# Patient Record
Sex: FEMALE | Race: WHITE | Employment: UNEMPLOYED | URBAN - METROPOLITAN AREA
[De-identification: names, ages, dates, MRNs, and addresses within clinical notes are randomized per-mention and may not be internally consistent; named-entity substitution may affect disease eponyms.]

---

## 2019-05-17 LAB
CREAT SERPL-MCNC: 0.92 MG/DL (ref 0.4–1)
GFR SERPL CREATININE-BSD FRML MDRD: >60 ML/MIN/1.73M2
GLUCOSE SERPL-MCNC: 123 MG/DL (ref 70–99)
POTASSIUM SERPL-SCNC: 4.1 MEQ/L (ref 3.4–5.1)

## 2019-05-18 ENCOUNTER — TRANSFERRED RECORDS (OUTPATIENT)
Dept: HEALTH INFORMATION MANAGEMENT | Facility: CLINIC | Age: 19
End: 2019-05-18

## 2019-05-18 ENCOUNTER — HOSPITAL ENCOUNTER (INPATIENT)
Facility: HOSPITAL | Age: 19
LOS: 2 days | Discharge: NURSING, REHAB OR LONG-TERM CARE | End: 2019-05-20
Attending: PSYCHIATRY & NEUROLOGY | Admitting: PSYCHIATRY & NEUROLOGY
Payer: COMMERCIAL

## 2019-05-18 PROBLEM — F32.A DEPRESSION WITH SUICIDAL IDEATION: Status: ACTIVE | Noted: 2019-05-18

## 2019-05-18 PROBLEM — R45.851 DEPRESSION WITH SUICIDAL IDEATION: Status: ACTIVE | Noted: 2019-05-18

## 2019-05-18 PROCEDURE — 25000132 ZZH RX MED GY IP 250 OP 250 PS 637: Performed by: NURSE PRACTITIONER

## 2019-05-18 PROCEDURE — 99223 1ST HOSP IP/OBS HIGH 75: CPT | Performed by: NURSE PRACTITIONER

## 2019-05-18 PROCEDURE — 12400000 ZZH R&B MH

## 2019-05-18 RX ORDER — BUPROPION HYDROCHLORIDE 300 MG/1
300 TABLET ORAL DAILY
COMMUNITY
Start: 2019-02-13

## 2019-05-18 RX ORDER — PRAMIPEXOLE DIHYDROCHLORIDE 0.25 MG/1
0.25 TABLET ORAL AT BEDTIME
COMMUNITY
Start: 2019-04-19

## 2019-05-18 RX ORDER — METFORMIN HCL 500 MG
500 TABLET, EXTENDED RELEASE 24 HR ORAL DAILY
COMMUNITY
Start: 2019-01-11

## 2019-05-18 RX ORDER — HYDROXYZINE HYDROCHLORIDE 25 MG/1
25-50 TABLET, FILM COATED ORAL EVERY 4 HOURS PRN
Status: DISCONTINUED | OUTPATIENT
Start: 2019-05-18 | End: 2019-05-20 | Stop reason: HOSPADM

## 2019-05-18 RX ORDER — IBUPROFEN 200 MG
400 TABLET ORAL EVERY 6 HOURS PRN
COMMUNITY

## 2019-05-18 RX ORDER — ALBUTEROL SULFATE 90 UG/1
2 AEROSOL, METERED RESPIRATORY (INHALATION) EVERY 4 HOURS PRN
Status: DISCONTINUED | OUTPATIENT
Start: 2019-05-18 | End: 2019-05-20 | Stop reason: HOSPADM

## 2019-05-18 RX ORDER — CHOLECALCIFEROL (VITAMIN D3) 125 MCG
3000 CAPSULE ORAL 3 TIMES DAILY PRN
Status: DISCONTINUED | OUTPATIENT
Start: 2019-05-18 | End: 2019-05-20 | Stop reason: HOSPADM

## 2019-05-18 RX ORDER — GUAIFENESIN 600 MG/1
600 TABLET, EXTENDED RELEASE ORAL EVERY 12 HOURS PRN
Status: DISCONTINUED | OUTPATIENT
Start: 2019-05-18 | End: 2019-05-20 | Stop reason: HOSPADM

## 2019-05-18 RX ORDER — MAGNESIUM HYDROXIDE/ALUMINUM HYDROXICE/SIMETHICONE 120; 1200; 1200 MG/30ML; MG/30ML; MG/30ML
15 SUSPENSION ORAL
COMMUNITY

## 2019-05-18 RX ORDER — OLANZAPINE 10 MG/1
10 TABLET ORAL 2 TIMES DAILY PRN
Status: DISCONTINUED | OUTPATIENT
Start: 2019-05-18 | End: 2019-05-18

## 2019-05-18 RX ORDER — PRAMIPEXOLE DIHYDROCHLORIDE 0.25 MG/1
0.25 TABLET ORAL AT BEDTIME
Status: DISCONTINUED | OUTPATIENT
Start: 2019-05-18 | End: 2019-05-20 | Stop reason: HOSPADM

## 2019-05-18 RX ORDER — METFORMIN HCL 500 MG
500 TABLET, EXTENDED RELEASE 24 HR ORAL DAILY
Status: DISCONTINUED | OUTPATIENT
Start: 2019-05-19 | End: 2019-05-20 | Stop reason: HOSPADM

## 2019-05-18 RX ORDER — ALUMINA, MAGNESIA, AND SIMETHICONE 2400; 2400; 240 MG/30ML; MG/30ML; MG/30ML
15 SUSPENSION ORAL
Status: DISCONTINUED | OUTPATIENT
Start: 2019-05-18 | End: 2019-05-20 | Stop reason: HOSPADM

## 2019-05-18 RX ORDER — TRAZODONE HYDROCHLORIDE 50 MG/1
50 TABLET, FILM COATED ORAL
COMMUNITY
Start: 2019-02-13

## 2019-05-18 RX ORDER — BUPROPION HYDROCHLORIDE 150 MG/1
300 TABLET ORAL DAILY
Status: DISCONTINUED | OUTPATIENT
Start: 2019-05-19 | End: 2019-05-20 | Stop reason: HOSPADM

## 2019-05-18 RX ORDER — IBUPROFEN 400 MG/1
400 TABLET, FILM COATED ORAL EVERY 6 HOURS PRN
Status: DISCONTINUED | OUTPATIENT
Start: 2019-05-18 | End: 2019-05-18

## 2019-05-18 RX ORDER — ACETAMINOPHEN 325 MG/1
650 TABLET ORAL EVERY 4 HOURS PRN
COMMUNITY
Start: 2019-02-06

## 2019-05-18 RX ORDER — OLANZAPINE 10 MG/2ML
10 INJECTION, POWDER, FOR SOLUTION INTRAMUSCULAR 2 TIMES DAILY PRN
Status: DISCONTINUED | OUTPATIENT
Start: 2019-05-18 | End: 2019-05-18

## 2019-05-18 RX ORDER — CHOLECALCIFEROL (VITAMIN D3) 125 MCG
3000 CAPSULE ORAL 3 TIMES DAILY PRN
COMMUNITY
Start: 2019-03-25

## 2019-05-18 RX ORDER — ARIPIPRAZOLE 15 MG/1
15 TABLET ORAL DAILY
Status: DISCONTINUED | OUTPATIENT
Start: 2019-05-19 | End: 2019-05-20 | Stop reason: HOSPADM

## 2019-05-18 RX ORDER — ACETAMINOPHEN 325 MG/1
650 TABLET ORAL EVERY 4 HOURS PRN
Status: DISCONTINUED | OUTPATIENT
Start: 2019-05-18 | End: 2019-05-20 | Stop reason: HOSPADM

## 2019-05-18 RX ORDER — VENLAFAXINE HYDROCHLORIDE 75 MG/1
225 CAPSULE, EXTENDED RELEASE ORAL DAILY
COMMUNITY
Start: 2019-04-26

## 2019-05-18 RX ORDER — IBUPROFEN 400 MG/1
400 TABLET, FILM COATED ORAL EVERY 6 HOURS PRN
Status: DISCONTINUED | OUTPATIENT
Start: 2019-05-18 | End: 2019-05-20 | Stop reason: HOSPADM

## 2019-05-18 RX ORDER — TRAZODONE HYDROCHLORIDE 50 MG/1
50 TABLET, FILM COATED ORAL
Status: DISCONTINUED | OUTPATIENT
Start: 2019-05-18 | End: 2019-05-20 | Stop reason: HOSPADM

## 2019-05-18 RX ORDER — GUAIFENESIN 600 MG/1
600 TABLET, EXTENDED RELEASE ORAL EVERY 12 HOURS
COMMUNITY

## 2019-05-18 RX ORDER — ALBUTEROL SULFATE 90 UG/1
2 AEROSOL, METERED RESPIRATORY (INHALATION) EVERY 4 HOURS PRN
COMMUNITY
Start: 2019-02-27

## 2019-05-18 RX ORDER — GUAIFENESIN 600 MG/1
600 TABLET, EXTENDED RELEASE ORAL EVERY 12 HOURS
Status: DISCONTINUED | OUTPATIENT
Start: 2019-05-18 | End: 2019-05-18 | Stop reason: CLARIF

## 2019-05-18 RX ORDER — HYDROXYZINE HYDROCHLORIDE 50 MG/1
75 TABLET, FILM COATED ORAL 3 TIMES DAILY PRN
COMMUNITY
Start: 2019-03-27

## 2019-05-18 RX ORDER — LANOLIN ALCOHOL/MO/W.PET/CERES
6 CREAM (GRAM) TOPICAL AT BEDTIME
COMMUNITY
Start: 2018-10-08

## 2019-05-18 RX ORDER — LISDEXAMFETAMINE DIMESYLATE 50 MG/1
50 CAPSULE ORAL EVERY MORNING
COMMUNITY
Start: 2019-05-09

## 2019-05-18 RX ADMIN — PRAMIPEXOLE DIHYDROCHLORIDE 0.25 MG: 0.25 TABLET ORAL at 21:25

## 2019-05-18 RX ADMIN — IBUPROFEN 400 MG: 400 TABLET ORAL at 13:10

## 2019-05-18 ASSESSMENT — ACTIVITIES OF DAILY LIVING (ADL)
DRESS: 0-->INDEPENDENT
BATHING: 0-->INDEPENDENT
RETIRED_COMMUNICATION: 0-->UNDERSTANDS/COMMUNICATES WITHOUT DIFFICULTY
TOILETING: 0-->INDEPENDENT
ORAL_HYGIENE: INDEPENDENT
SWALLOWING: 0-->SWALLOWS FOODS/LIQUIDS WITHOUT DIFFICULTY
HYGIENE/GROOMING: INDEPENDENT
COGNITION: 0 - NO COGNITION ISSUES REPORTED
DRESS: INDEPENDENT;SCRUBS (BEHAVIORAL HEALTH)
FALL_HISTORY_WITHIN_LAST_SIX_MONTHS: NO
RETIRED_EATING: 0-->INDEPENDENT
AMBULATION: 0-->INDEPENDENT
TRANSFERRING: 0-->INDEPENDENT
LAUNDRY: UNABLE TO COMPLETE

## 2019-05-18 ASSESSMENT — MIFFLIN-ST. JEOR
SCORE: 2089.04
SCORE: 2089.04
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
## 2019-05-18 NOTE — PLAN OF CARE
"Face to face end of shift received from Jossy BAEZ RN. Rounding completed and patient observed in group.    20:00 Update: Patient endorsed anxiety and said it was high today but \"that's to be expected.\" She said her depression \"wasn't too bad.\" She denied HI and hallucinations. She said she was having thoughts of harming herself and felt she would act on them if she wasn't here. She said she felt safe here and contracted for safety while on the unit. Patient attended some groups but did isolate to her room later in the shift. She was observed telling another peer he was making her feel uncomfortable because he was staring at her (which he was). She is blunt and to the point but appropriate. Patient denied any pain and said she had felt relief from menstrual cramps after showering and lying down this evening. She did request her melatonin that she usually takes at night. She was offered trazodone but said she doesn't like taking it because it makes her feel foggy the next day. She agreed to come up and ask for something if she can't sleep.    23:10 Update: Face to face end of shift report communicated to Mague BLISS RN.    Terry Apodaca  5/18/2019  11:01 PM               Problem: Adult Behavioral Health Plan of Care  Goal: Patient-Specific Goal (Individualization)  Description  Pt. Will follow recommendations of treatment team during hospital stay.   Pt. Will increase coping skills during hospital stay.   Pt. Will be free from injury during hospital stay.  Pt. Will maintain daily ADLs without prompting.   Pt. Verbalize manageable SI by discharge.       5/18/2019 1613 by Terry Apodaca, RN  Outcome: No Change     Problem: Suicide Risk  Goal: Absence of Self-Harm  Outcome: No Change           "

## 2019-05-18 NOTE — PROGRESS NOTES
05/18/19 0845   Patient Belongings   Did you bring any home meds/supplements to the hospital?  No   Patient Belongings locker   Patient Belongings Put in Hospital Secure Location (Security or Locker, etc.) clothing;shoes;other (see comments)   Belongings Search Yes   Clothing Search Yes   Second Staff Ananya RN   Comment Work search book (with patient), marker, gray skecher shoes, gray sponge siena pajama's, red shorts   List items sent to safe: None  All other belongings put in assigned cubby in belongings room.    I have reviewed my belongings list on admission and verify that it is correct.     Patient signature_______________________________    Second staff witness (if patient unable to sign) ______________________________       I have received all my belongings at discharge.    Patient signature________________________________    Jocelynn LUNA  5/18/2019  8:48 AM
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
## 2019-05-19 PROCEDURE — 99232 SBSQ HOSP IP/OBS MODERATE 35: CPT | Performed by: NURSE PRACTITIONER

## 2019-05-19 PROCEDURE — 12400000 ZZH R&B MH

## 2019-05-19 PROCEDURE — 25000132 ZZH RX MED GY IP 250 OP 250 PS 637: Performed by: NURSE PRACTITIONER

## 2019-05-19 RX ORDER — LANOLIN ALCOHOL/MO/W.PET/CERES
6 CREAM (GRAM) TOPICAL AT BEDTIME
Status: DISCONTINUED | OUTPATIENT
Start: 2019-05-19 | End: 2019-05-20 | Stop reason: HOSPADM

## 2019-05-19 RX ADMIN — LACTASE TAB 3000 UNIT 3000 UNITS: 3000 TAB at 20:44

## 2019-05-19 RX ADMIN — ARIPIPRAZOLE 15 MG: 15 TABLET ORAL at 08:24

## 2019-05-19 RX ADMIN — LISDEXAMFETAMINE DIMESYLATE 50 MG: 30 CAPSULE ORAL at 08:24

## 2019-05-19 RX ADMIN — PRAMIPEXOLE DIHYDROCHLORIDE 0.25 MG: 0.25 TABLET ORAL at 20:33

## 2019-05-19 RX ADMIN — BUPROPION HYDROCHLORIDE 300 MG: 150 TABLET, FILM COATED, EXTENDED RELEASE ORAL at 08:23

## 2019-05-19 RX ADMIN — METFORMIN HYDROCHLORIDE 500 MG: 500 TABLET, EXTENDED RELEASE ORAL at 08:24

## 2019-05-19 RX ADMIN — VENLAFAXINE HYDROCHLORIDE 225 MG: 150 CAPSULE, EXTENDED RELEASE ORAL at 08:24

## 2019-05-19 RX ADMIN — MELATONIN TAB 3 MG 6 MG: 3 TAB at 20:33

## 2019-05-19 ASSESSMENT — ACTIVITIES OF DAILY LIVING (ADL)
ORAL_HYGIENE: INDEPENDENT
DRESS: SCRUBS (BEHAVIORAL HEALTH)
HYGIENE/GROOMING: INDEPENDENT
LAUNDRY: UNABLE TO COMPLETE
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
## 2019-05-19 NOTE — PLAN OF CARE
Face to face end of shift report received from Radha BAEZ RN. Rounding completed and patient observed in her room awake. No requests at this time.     Terry Apodaca  5/19/2019  4:17 PM    19:00 Update: Patient denied anxiety, HI and hallucinations. She endorsed some depression and SI but contracts for safety while she is here. She said she had not attended groups today because she didn't know when they were and did not recall meeting with a provider. Her affect is blunted and flat today which is changed from previous. She said she barely slept last night due to not having melatonin. She denied pain. She is malodorous. She did attend groups this shift. Patient said she does not like the group home she lives in but said she is willing to go back there. She explained her goal is to live on her own but she realizes this will take time to find an apartment so she will go back to her group home until then.      23:30 Update: This writer will continue to provide nursing services for patient throughout the next shift. Patient observed lying in bed with eyes closed, breathing regular and unlabored.     06:00am Update: Patient appeared to sleep soundly throughout the night with position changes noted. She did not request any PRNs and had no complaints of pain.     07:20am Update: Face to face end of shift report communicated to Dez BACA RN.     Terry Apodaca  5/20/2019  6:46 AM                Problem: Adult Behavioral Health Plan of Care  Goal: Pt. Will follow recommendations of treatment team during hospital stay.   Pt. Will increase coping skills during hospital stay.   Pt. Will be free from injury during hospital stay.  Pt. Will maintain daily ADLs without prompting.   Pt. Verbalize manageable SI by discharge.     Outcome: No Change     Problem: Suicide Risk  Goal: Absence of Self-Harm  5/19/2019 1617 by Terry Apodaca, RN  Outcome: No Change

## 2019-05-19 NOTE — PLAN OF CARE
Problem: Adult Behavioral Health Plan of Care  Goal: Patient-Specific Goal (Individualization)  Description  Pt. Will follow recommendations of treatment team during hospital stay.   Pt. Will increase coping skills during hospital stay.   Pt. Will be free from injury during hospital stay.  Pt. Will maintain daily ADLs without prompting.   Pt. Verbalize manageable SI by discharge.       5/19/2019 0841 by Radha Berry, RN  Outcome: Improving  Patient denies SI, HI, cohen., pain, depression and anxiety. She contracts for safety. She is in the [facility] area this morning, and social with staff and peers. She states that she is doing well today because she feels that she is being listened to and feels that she is understood here.    1445: Patient has been resting in her room off and on this shift. She is pleasant and cooperative. Affect is bright.    Problem: Suicide Risk  Goal: Absence of Self-Harm  5/19/2019 0841 by Radha Berry, RN  Outcome: Improving   Patient has not had any self-injurious behaviors. She contracts for safety. Will continue to monitor.      Face to face end of shift report communicated to Terry LARSON RN. Rounding completed and patient observed.    Radha Berry  5/19/2019  3:17 PM

## 2019-05-19 NOTE — PLAN OF CARE
Problem: Adult Behavioral Health Plan of Care  Goal: Patient-Specific Goal (Individualization)  Description  Pt. Will follow recommendations of treatment team during hospital stay.   Pt. Will increase coping skills during hospital stay.   Pt. Will be free from injury during hospital stay.  Pt. Will maintain daily ADLs without prompting.   Pt. Verbalize manageable SI by discharge.       5/19/2019 0002 by Kelley Merino, RN  Outcome: No Change  Note:   2330: Rounding completed and patient observed in room, awake. Pt has no questions or concerns at this time. Pt states she is going back to bed. Will continue to monitor and document any changes.   0600: Pt appears asleep at this time. 15 minute safety checks and PRN checks done throughout the shift. Position changes noted.   0715: Face to face end of shift report communicated to Radha BAEZ RN.. Rounding completed and patient observed.    Kelley Merino  5/19/2019  7:13 AM              Problem: Suicide Risk  Goal: Absence of Self-Harm  5/19/2019 0002 by Kelley Merino, RN  Outcome: No Change  5/18/2019 1613 by Terry Apodaca RN  Outcome: No Change

## 2019-05-19 NOTE — PLAN OF CARE
Face to face end of shift report communicated to oncoming RN. Rounding completed and patient observed in [facility].        Jossy Wheeler  5/18/2019  1530 PM

## 2019-05-20 VITALS
BODY MASS INDEX: 42.28 KG/M2 | WEIGHT: 279 LBS | TEMPERATURE: 97 F | DIASTOLIC BLOOD PRESSURE: 77 MMHG | RESPIRATION RATE: 18 BRPM | OXYGEN SATURATION: 96 % | SYSTOLIC BLOOD PRESSURE: 127 MMHG | HEIGHT: 68 IN | HEART RATE: 92 BPM

## 2019-05-20 PROCEDURE — 99238 HOSP IP/OBS DSCHRG MGMT 30/<: CPT | Performed by: NURSE PRACTITIONER

## 2019-05-20 PROCEDURE — 25000132 ZZH RX MED GY IP 250 OP 250 PS 637: Performed by: NURSE PRACTITIONER

## 2019-05-20 RX ADMIN — METFORMIN HYDROCHLORIDE 500 MG: 500 TABLET, EXTENDED RELEASE ORAL at 08:31

## 2019-05-20 RX ADMIN — VENLAFAXINE HYDROCHLORIDE 225 MG: 150 CAPSULE, EXTENDED RELEASE ORAL at 08:31

## 2019-05-20 RX ADMIN — LISDEXAMFETAMINE DIMESYLATE 50 MG: 30 CAPSULE ORAL at 08:31

## 2019-05-20 RX ADMIN — ARIPIPRAZOLE 15 MG: 15 TABLET ORAL at 08:31

## 2019-05-20 RX ADMIN — BUPROPION HYDROCHLORIDE 300 MG: 150 TABLET, FILM COATED, EXTENDED RELEASE ORAL at 08:31

## 2019-05-20 NOTE — PLAN OF CARE
Pt is discharging at the recommendation of the treatment team. Pt is discharging back to her foster home transported by [facility] . Pt denies having any thoughts of hurting themself or anyone else. Pt denies anxiety or depression. Discharge instructions, including; demographic sheet, psychiatric evaluation, discharge summary, and AVS were faxed to these next level of care providers.

## 2019-05-20 NOTE — PLAN OF CARE
Spoke to Niko,  at [facility].   Updated niko on pt's status - provider informed writer pt is able to discharge today, as she is denying all criteria and not participating in group programming. Niko stated she will talk to her nursing staff, but she should be able to  pt at 2pm today. [facility] uses [facility] in [place] if medications are needed to be sent.   Writer also spoke to BERNA REYES and ELIECER CM, updating them on the plan.

## 2019-05-20 NOTE — DISCHARGE SUMMARY
"Psychiatric Discharge Summary    Coreen Maya MRN# [id number]   Age: 18 year old YOB: 2000     Date of Admission:  5/18/2019  Date of Discharge:  5/20/2019  Admitting Physician:  Agapito Lo MD  Discharge Physician:  Zack Monreal NP          Event Leading to Hospitalization and Hospital Stay   Per HPI as completed by FAHAD Marie, CNP on 5/18/2019:   This patient is a 18 year old  female with a significant past psychiatric history of  depression and fetal alcohol syndrome who presents with suicidal ideation.  Arrived here from the ED at [facility] where staff had brought her due to her increased SI. Patient reports suicidal ideation is an everyday thought for her as is depression \"but sometimes they are in the back of my brain so they are not as loud\". A room mate began yelling at her which activated her negative thoughts and resulted in her going to the ED. Pt has over dosed in the past. She hates where she is living although she has lived there only for the past month. Pt reports her plan was to run into traffic. Pt has been placed on a 72 hr hold, discussed with pt who has no concerns with the hold, \"I feel safe here\".         Past Psychiatric HX:              Hospitalizations: Last hospitalization was at [facility] several months ago. She reports 4 others, 1 @ age 15, 1 @ age 16 & 2 at age 17. She has never gone to CD treatment.                Current Stressors: \"Having a room mate\".             Stay:  Admitted to unit on a 72 hour hold implemented by the transferring ED. Provided a safe environment and therapeutic milieu. Encouraged participation in unit activities. No medication changes were made as she reported that suicidal thoughts are chronic and just \"something I need to get through.\" she denied intent throughout her stay. She also denied any symptoms of psychosis, voices and other thoughts of harming herself. While discussing SI, she indicated that " the thoughts are always present, but sometimes get worse. She continues to deny any intent to act on the thoughts and would like to return to the group home. She is not sleeping well here and has not participated in any groups or unit activities. There is no reason she needs to remain through her hold. The group home is just as capable of keeping her safe.The treatment team recommends that a safety plan be implemented during times her SI increases, versus hospitalization, especially considering the chronicity of her thoughts, and how easily they are triggered.     Our team has made contact with group [facility] to ensure readiness for discharge.     At time of discharge, there is no evidence that patient is in immediate danger of self or others.        DIagnoses:     Major Depressive Disorder, Recurrent, Moderate  Anxiety, NOS  Fetal Alcohol Spectrum Disorder             Labs:   Preadmission labs available in paper chart.         Discharge Medications:     Current Discharge Medication List      CONTINUE these medications which have NOT CHANGED    Details   ARIPiprazole, sensor, (ABILIFY MYCITE) 15 MG TABS Take 15 mg by mouth daily      buPROPion (WELLBUTRIN XL) 300 MG 24 hr tablet Take 300 mg by mouth daily      etonogestrel (NEXPLANON) 68 MG IMPL Inject 68 mg Subcutaneous      ibuprofen (ADVIL/MOTRIN) 200 MG tablet Take 400 mg by mouth every 6 hours as needed for mild pain      lactase (LAC-DOSE) 3000 UNIT tablet Take 3,000 Units by mouth 3 times daily as needed (Take before meals as needed.)       lisdexamfetamine (VYVANSE) 50 MG capsule Take 50 mg by mouth every morning      melatonin 3 MG tablet Take 6 mg by mouth At Bedtime      metFORMIN (GLUCOPHAGE-XR) 500 MG 24 hr tablet Take 500 mg by mouth daily      pramipexole (MIRAPEX) 0.25 MG tablet Take 0.25 mg by mouth At Bedtime      venlafaxine (EFFEXOR-XR) 75 MG 24 hr capsule Take 225 mg by mouth daily      acetaminophen (TYLENOL) 325 MG tablet Take 650 mg by mouth  every 4 hours as needed      albuterol (PROVENTIL HFA) 108 (90 Base) MCG/ACT inhaler Inhale 2 puffs into the lungs every 4 hours as needed      alum & mag hydroxide-simethicone (MYLANTA/MAALOX) 200-200-20 MG/5ML SUSP suspension Take 15 mLs by mouth every 3 hours as needed for indigestion (q 3-4 hours as needed up to 4 times in 24 hours.)      guaiFENesin (MUCINEX) 600 MG 12 hr tablet Take 600 mg by mouth every 12 hours       guaiFENesin (ROBITUSSIN) 100 MG/5ML SYRP Take 10 mLs by mouth every 4 hours as needed for cough      hydrOXYzine (ATARAX) 50 MG tablet Take 75 mg by mouth 3 times daily as needed      magnesium hydroxide (MILK OF MAGNESIA) 400 MG/5ML suspension Take 30 mLs by mouth daily as needed for constipation or heartburn      menthol (COUGH DROP) 7 MG LOZG Take 1 lozenge by mouth every hour as needed for cough      traZODone (DESYREL) 50 MG tablet Take 50 mg by mouth nightly as needed                  Psychiatric Examination:   Appearance:  awake, alert  Attitude:  cooperative  Eye Contact:  good  Mood:  better  Affect:  mood congruent  Speech:  clear, coherent  Psychomotor Behavior:  no evidence of tardive dyskinesia, dystonia, or tics  Thought Process:  goal oriented  Associations:  no loose associations  Thought Content:  no evidence of suicidal ideation or homicidal ideation and no evidence of psychotic thought  Insight:  good  Judgment:  intact  Oriented to:  time, person, and place  Attention Span and Concentration:  fair  Recent and Remote Memory:  intact  Fund of Knowledge: low-normal  Muscle Strength and Tone: normal  Gait and Station: Normal  Perception: No perceptual disorder       Discharge Plan:     Discharge back to [facility] home. Transportation arranged.  No medication changes made.  Follow-up with outpatient services as scheduled.     Attestation:  The patient has been seen and evaluated by me,  Zack Monreal NP         Discharge Services Provided:    20 minutes spent on discharge  services, including:  Final examination of patient.  Review and discussion of Hospital stay.  Instructions for continued outpatient care/goals.  Preparation of discharge records.

## 2019-05-20 NOTE — PROGRESS NOTES
Discharge Note    Patient Discharged to group home on 5/20/2019 2:25 PM via Private Car accompanied by unit staff and group home staff to car.     Patient informed of discharge instructions in AVS. patient verbalizes understanding and denies having any questions pertaining to AVS. Patient stable at time of discharge. Patient denies SI, HI, and thoughts of self harm at time of discharge. All personal belongings returned to patient. No discharge prescriptions. Psych evaluation, history and physical, AVS, and discharge summary faxed to next level of care per social workWin Downey.     Dez King  5/20/2019  2:25 PM

## 2019-05-20 NOTE — PLAN OF CARE
Face to face end of shift report received from DEMOND Stewart. Rounding completed and patient observed in room.     Dez King  5/20/2019  7:40 AM       Problem: Adult Behavioral Health Plan of Care  Goal: Patient-Specific Goal (Individualization)  Description  Pt. Will follow recommendations of treatment team during hospital stay.   Pt. Will increase coping skills during hospital stay.   Pt. Will be free from injury during hospital stay.  Pt. Will maintain daily ADLs without prompting.   Pt. Verbalize manageable SI by discharge.       Outcome: Improving  She has been laying in bed most of the morning. She is in group at this time. She says that she always has some kind of suicidal thinking. She feels safe today. She denies feeling depressed or anxious. She is compliant with her medications. She is maintaining appropriate boundaries with staff and peers. She is dressed and groomed appropriately on the unit.      Problem: Suicide Risk  Goal: Absence of Self-Harm  Description  Patient will deny SI by discharge.    Outcome: Improving  She is not actively suicidal. She is feeling safe.

## 2019-05-20 NOTE — DISCHARGE INSTRUCTIONS
Behavioral Discharge Planning and Instructions    Summary: Coreen was admitted with suicidal ideation with a plan     Main Diagnosis: Major depressive disorder, recurrent    Major Treatments, Procedures and Findings: Stabilize with medications, connect with community programs.    Symptoms to Report: feeling more aggressive, increased confusion, losing more sleep, mood getting worse or thoughts of suicide    Lifestyle Adjustment: Take all medications as prescribed, meet with doctor/ medication provider, out patient therapist, , and [facility] worker as scheduled. Abstain from alcohol or any unprescribed drugs.    Psychiatry Follow-up:     [facility] CM - Arabella CORONEL CM - Ale   [facility]  [address]  [place]  [address]  Phone - [phone]     Fax - [phone]    [facility]   PCP - Dr. La - as previously arranged   Medication Management - Skyler Echeverria - as previously arranged   [address]  [place] [address]   Phone: [phone]    Resources:   Crisis Intervention: 343.277.1436 or 364-800-1410 (TTY: 356.681.9932).  Call anytime for help.  National [place] on Mental Illness (www.[place].michael.org): 769.743.4470 or 027-104-9093.  Alcoholics Anonymous (www.alcoholics-anonymous.org): Check your phone book for your local chapter.  Suicide Awareness Voices of Education (SAVE) (www.save.org): 476-064-OXTZ (8783)  National Suicide Prevention Line (www.mentalhealthmn.org): 680-726-TCLJ (3727)  Mental Health Consumer/Survivor Network of [place] (www.mhcsn.net): 112.491.5502 or 832-451-4059  Mental Health Association of [place] (www.mentalhealth.org): 521.951.3780 or 318-467-8649    [place] Area:  [facility], [facility] stabilization [facility]- 631.242.7748  [facility] Crisis Line: 1-101.626.6783  Advocates For Family Peace: 714-6812  Sexual Assault Program [facility]: 403.742.9133 or 1-368.978.7395  [facility] [place] Battered Women[facility] Program:  "9-703-438-2765 Ext: 279       Calls answered Mon-Fri-8:00 am--4:30 pm    [place]:  Advocates for Family Peace: 7-459-589-1582  [facility] first call for help: 7-365-832-2018  [facility]:  (456) 135-3454      [place] Area:  Warm Line: 1-277.590.9197       Calls answered Tuesday--Saturday 4:00 pm--10:00 pm  [facility] Crisis Line - 123.737.6421  [facility] 459-802-9084    [place] Statewide:  [place] Crisis and Referral Services: 1-578.194.5877  National Suicide Prevention Lifeline: 9-210-305-TALK (0247)   - kcg7dxjx- Text \"Life\" to 35367  First Call for Help: 2-1-1  [facility] Helpline- 6-955-IKFP-HELP        General Medication Instructions:   See your medication sheet(s) for instructions.   Take all medicines as directed.  Make no changes unless your doctor suggests them.   Go to all your doctor visits.  Be sure to have all your required lab tests. This way, your medicines can be refilled on time.  Do not use any drugs not prescribed by your doctor.  Avoid alcohol.    "

## 2019-05-20 NOTE — PLAN OF CARE
Social Service Psychosocial Assessment  Presenting Problem:  Pt was admitted with suicidal thoughts and a plan to walk into traffic. Pt has a hx of depression and fetal alcohol syndrome.   Marital Status:  Single   Spouse / Children:    None   Psychiatric TX HX:   Significant hx. Last hospitalized at [facility] months ago. Reports other hospitalizations: 1 @ age 15, 1 @ age 16 & 2 at age 17  Suicide Risk Assessment:  Was reporting SI on admission with a plan to walk into traffic. Denying SI, .VH a this time.   Access to Lethal Means (explain):   Denies   Family Psych HX:  MI/CD history   A & Ox:   x3  Medication Adherence:  Reports compliance  Medical Issues:   See H&P  Visual -Motor Functioning:   Hx of FAS  Communication Skills /Needs:   Hx of FAS  Ethnicity:   White     Spirituality/Sikh Affiliation:   No Clergy Request:   No   History:   Denies  Living Situation:   Lives @ [facility]  ADL s:  No concerns   Education:  Unknown   Financial Situation:  CADI  Occupation:  Volunteers   Leisure & Recreation:  reading and writing short stories.  Childhood History:   Adopted at age 1, is still in close contact with bio family.   Trauma Abuse HX:   Unknown  Relationship / Sexuality:  Not addressed   Substance Use/ Abuse:   Denies   Chemical Dependency Treatment HX:  Denies   Legal Issues: Denies  Significant Life Events:  Adopted   Strengths:   Currently in a safe place, community supports    Challenges /Limitation:  MH symptoms, lack of coping skills   Patient Support Contact (Include name, relationship, number, and summary of conversation):     Interventions:       Community-Based Programs - Ale CORONEL CM...  AMY Shipley    Medical/Dental Care - PCP Dr. Bessie MACIAS Evaluation/Rule 25/Aftercare    Medication Management - Skyler Echeverria [facility]     Suicide Risk Assessment - Was reporting SI on admission with a plan to walk into traffic. Denying SI, .VH a this time.     High Risk  Safety Plan Talk to supports; Call crisis lines; Go to local ER if feeling suicidal.